# Patient Record
Sex: MALE | URBAN - METROPOLITAN AREA
[De-identification: names, ages, dates, MRNs, and addresses within clinical notes are randomized per-mention and may not be internally consistent; named-entity substitution may affect disease eponyms.]

---
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
## 2020-11-10 ENCOUNTER — EMERGENCY (EMERGENCY)
Facility: HOSPITAL | Age: 20
LOS: 1 days | Discharge: HOME | End: 2020-11-10
Admitting: EMERGENCY MEDICINE
Payer: MEDICAID

## 2020-11-10 VITALS
OXYGEN SATURATION: 99 % | WEIGHT: 149.91 LBS | HEIGHT: 69 IN | SYSTOLIC BLOOD PRESSURE: 129 MMHG | RESPIRATION RATE: 17 BRPM | HEART RATE: 87 BPM | DIASTOLIC BLOOD PRESSURE: 88 MMHG | TEMPERATURE: 98 F
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
## 2020-11-10 DIAGNOSIS — S50.812A ABRASION OF LEFT FOREARM, INITIAL ENCOUNTER: ICD-10-CM
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
## 2020-11-10 DIAGNOSIS — S05.12XA CONTUSION OF EYEBALL AND ORBITAL TISSUES, LEFT EYE, INITIAL ENCOUNTER: ICD-10-CM

## 2020-11-10 DIAGNOSIS — N44.00 TORSION OF TESTIS, UNSPECIFIED: Chronic | ICD-10-CM

## 2020-11-10 DIAGNOSIS — Z23 ENCOUNTER FOR IMMUNIZATION: ICD-10-CM

## 2020-11-10 DIAGNOSIS — S50.12XA CONTUSION OF LEFT FOREARM, INITIAL ENCOUNTER: ICD-10-CM
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
## 2020-11-10 PROCEDURE — 73080 X-RAY EXAM OF ELBOW: CPT | Mod: 26,RT

## 2020-11-10 PROCEDURE — 99283 EMERGENCY DEPT VISIT LOW MDM: CPT | Mod: 25

## 2020-11-10 RX ORDER — IBUPROFEN 200 MG
600 TABLET ORAL ONCE
Refills: 0 | Status: COMPLETED | OUTPATIENT
Start: 2020-11-10 | End: 2020-11-10

## 2020-11-10 RX ORDER — ACETAMINOPHEN 500 MG
650 TABLET ORAL ONCE
Refills: 0 | Status: COMPLETED | OUTPATIENT
Start: 2020-11-10 | End: 2020-11-10

## 2020-11-10 RX ORDER — TETANUS TOXOID, REDUCED DIPHTHERIA TOXOID AND ACELLULAR PERTUSSIS VACCINE, ADSORBED 5; 2.5; 8; 8; 2.5 [IU]/.5ML; [IU]/.5ML; UG/.5ML; UG/.5ML; UG/.5ML
0.5 SUSPENSION INTRAMUSCULAR ONCE
Refills: 0 | Status: COMPLETED | OUTPATIENT
Start: 2020-11-10 | End: 2020-11-10

## 2020-11-10 RX ADMIN — TETANUS TOXOID, REDUCED DIPHTHERIA TOXOID AND ACELLULAR PERTUSSIS VACCINE, ADSORBED 0.5 MILLILITER(S): 5; 2.5; 8; 8; 2.5 SUSPENSION INTRAMUSCULAR at 02:02

## 2020-11-10 RX ADMIN — Medication 650 MILLIGRAM(S): at 02:02

## 2020-11-10 RX ADMIN — Medication 600 MILLIGRAM(S): at 02:02

## 2020-11-10 NOTE — ED PROVIDER NOTE - PATIENT PORTAL LINK FT
You can access the FollowMyHealth Patient Portal offered by [facility] by registering at the following website: [email or web address]. By joining [employer]’s FollowMyHealth portal, you will also be able to view your health information using other applications (apps) compatible with our system.

## 2020-11-10 NOTE — ED PROVIDER NOTE - NSFOLLOWUPINSTRUCTIONS_ED_ALL_ED_FT
Contusion    A contusion is a deep bruise. Contusions are the result of a blunt injury to tissues and muscle fibers under the skin. The skin overlying the contusion may turn blue, purple, or yellow. Symptoms also include pain and swelling in the injured area.    SEEK IMMEDIATE MEDICAL CARE IF YOU HAVE ANY OF THE FOLLOWING SYMPTOMS: severe pain, numbness, tingling, pain, weakness, or skin color/temperature change in any part of your body distal to the injury.      An abrasion is a scrape on your skin. It happens when your skin rubs against a rough surface. Some examples of an abrasion include rug burn, a skinned elbow, or road rash. Abrasions can be many shapes and sizes. The wound may hurt, bleed, bruise, or swell.     DISCHARGE INSTRUCTIONS:    Return to the emergency department if:   •The bleeding does not stop after 10 minutes of firm pressure.  •You cannot rinse one or more foreign objects out of your wound.  •You have red streaks on your skin coming from your wound.      CLEANSE WOUND WITH SOAP AND WATER DAILY.  APPLY ANTIBIOTIC OINTMENT A CLEAN DRESSING.

## 2020-11-10 NOTE — ED PROVIDER NOTE - DIAGNOSTIC INTERPRETATION
Interpreted by MARQUEZ Rivera elbow x-ray, 3 views  No acute fracture, no dislocation (joint spaces grossly normal), no radiopaque foreign body noted, soft tissue normal

## 2020-11-10 NOTE — ED ADULT NURSE NOTE - PAIN: PRESENCE, MLM
1.  The patient was informed that his symptoms appear to be responded to conservative measures  2.  I recommend continuing a high-fiber diet  3.  Continue fiber supplementation twice daily  4.  Continue high-volume water intake  5.  Sitz bath's twice a day for the next 2 weeks  6.  Continue diltiazem/lidocaine ointment to anal margin 3 times daily  7.  Return to clinic as needed    Flynn Philip MD [facility]  Colon and Rectal Surgery, Department of Surgery  Surgical Director of Digestive Health  [place] [address]  Ph: [phone]  Fax: [phone]     A total of 15 minutes were spent with the patient during this encounter and over half of that time was spent on counseling and coordination of care.  We discussed in depth the importance of maintaining a healthy lifestyle, excerise and nutrition. I also counseled the patient on the the importance of compliance with my recommendations for long term results.   complains of pain/discomfort
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
## 2020-11-21 NOTE — ED POST DISCHARGE NOTE - DETAILS
message left with mother. vm full  cannot leave message on phone Contacted on 11/23/20 regarding x-ray findings.  No complaints.  Recommend orthopedic follow up for imaging.

## 2020-12-03 PROBLEM — Z00.00 ENCOUNTER FOR PREVENTIVE HEALTH EXAMINATION: Status: ACTIVE | Noted: 2020-12-03

## 2020-12-07 ENCOUNTER — RESULT REVIEW (OUTPATIENT)
Age: 20
End: 2020-12-07

## 2020-12-07 ENCOUNTER — APPOINTMENT (OUTPATIENT)
Dept: ORTHOPEDIC SURGERY | Facility: CLINIC | Age: 20
End: 2020-12-07
Payer: MEDICAID

## 2020-12-07 ENCOUNTER — OUTPATIENT (OUTPATIENT)
Dept: OUTPATIENT SERVICES | Facility: HOSPITAL | Age: 20
LOS: 1 days | End: 2020-12-07
Payer: MEDICAID

## 2020-12-07 VITALS — WEIGHT: 150 LBS | HEIGHT: 70 IN | BODY MASS INDEX: 21.47 KG/M2

## 2020-12-07 DIAGNOSIS — Z78.9 OTHER SPECIFIED HEALTH STATUS: ICD-10-CM

## 2020-12-07 DIAGNOSIS — S52.121A DISPLACED FRACTURE OF HEAD OF RIGHT RADIUS, INITIAL ENCOUNTER FOR CLOSED FRACTURE: ICD-10-CM

## 2020-12-07 DIAGNOSIS — Z83.3 FAMILY HISTORY OF DIABETES MELLITUS: ICD-10-CM

## 2020-12-07 DIAGNOSIS — N44.00 TORSION OF TESTIS, UNSPECIFIED: Chronic | ICD-10-CM

## 2020-12-07 DIAGNOSIS — F17.200 NICOTINE DEPENDENCE, UNSPECIFIED, UNCOMPLICATED: ICD-10-CM

## 2020-12-07 PROCEDURE — 24650 CLTX RDL HEAD/NCK FX WO MNPJ: CPT | Mod: RT

## 2020-12-07 PROCEDURE — 99072 ADDL SUPL MATRL&STAF TM PHE: CPT

## 2020-12-07 PROCEDURE — 73080 X-RAY EXAM OF ELBOW: CPT | Mod: 26,RT

## 2020-12-07 PROCEDURE — 73080 X-RAY EXAM OF ELBOW: CPT

## 2020-12-07 PROCEDURE — 99204 OFFICE O/P NEW MOD 45 MIN: CPT | Mod: 57
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
## 2020-12-07 NOTE — PHYSICAL EXAM
[de-identified] : General: Patient is awake and alert, demonstrates appropriate mood and affect, exhibits normal breathing and is in no acute distress.\par Constitutional: Well appearing in no apparent distress\par Skin: The skin is intact, warm, pink, and dry over the area examined.\par Lymph: There is no lymphedema\par Cardiovascular: There is brisk capillary refill in the digits of the affected extremity. They are symmetric pulses in the bilateral upper and lower extremities. \par Respiratory: The patient is in no apparent respiratory distress. They're taking full deep breaths without use of accessory muscles or evidence of audible wheezes or stridor without the use of a stethoscope. \par Neurological: 5/5 motor strength in the main muscle groups of bilateral upper extremities, sensory intact in bilateral upper extremities\par Musculoskeletal:. normal gait for age. good posture. normal clinical alignment in upper and lower extremities. normal clinical alignment of the spine. full range of motion in bilateral upper and lower extremities except as noted below\par \par \par Normal tandem gait, normal heel / toe walk\par \par Cervical exam:\par Full range of motion, flexion extension limited lateral rotation and bending\par \par [Right] elbow:\par Skin is intact\par Erythema [No]\par Swelling [No]\par \par Range of motion:\par Extension - Flexion: 0-140° - symmetric to contralateral elbow\par Supination/Pronation: 90/25\par \par \par TTP:\par Medial Epicondyle [No]\par UCL [No]\par Lateral Epicondyle [No]\par ECRB Origin [No]\par Radial tunnel [No]\par Olecranon [No]\par Distal Biceps [No]\par \par No pain or crepitus with ROM\par \par No pain with resisted wrist flexion \par No pain with resisted wrist extension\par No Pain with wrist pronosupination\par \par Stable to varus and valgus stress\par \par Negative tinnel's at cubital and carpal tunnel\par No thenar or hypothenal wasting\par \par Special Testing\par Valgus stress: No pain\par Moving valgus stress test: Negative\par Bounce home: Negative\par Temple pressure sign: Negative\par \par Motor: AiN/PIN/M/R/U intact\par 5/5 C5-T1\par Sens: M/R/U/Ax intact to light touch\par \par 2+ radial and ulnar pulses, BCR\par \par \par \par  [de-identified] : X-rays from 1 month ago show nondisplaced radial neck fracture.\par \par Date: 12/07/2020 \par Location: [facility], 5th Floor imaging\par Body part: [Right Elbow\par Views: AP, Lateral, Oblique\par Impression: Healing fracture of the right radial neck., joint space preserved\par
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
